# Patient Record
(demographics unavailable — no encounter records)

---

## 2024-12-30 NOTE — ASSESSMENT
[FreeTextEntry1] : I have discussed with pt that hernia pain usually radiates down the leg and does not wrap around the hip and back and I would therefore like to do a ct scan to make sure there is not another cause of his pain

## 2024-12-30 NOTE — REVIEW OF SYSTEMS
[Dysuria] : no dysuria [Incontinence] : no incontinence [Hesitancy] : no urinary hesitancy [Nocturia] : no nocturia [Genital Lesion] : no genital lesions [Testicular Pain] : no testicular pain [Arthralgias] : arthralgias [Joint Swelling] : no joint swelling [Joint Stiffness] : no joint stiffness [Limb Pain] : no limb pain [Limb Swelling] : no limb swelling [Easy Bleeding] : no tendency for easy bleeding [Easy Bruising] : a tendency for easy bruising [Swollen Glands] : no swollen glands [Swollen Glands In The Neck] : no swollen glands in the neck [Negative] : Endocrine [FreeTextEntry8] : hx of renal colic [FreeTextEntry9] : hx of scoliosis  [de-identified] : on ASA

## 2024-12-30 NOTE — PHYSICAL EXAM
[JVD] : no jugular venous distention  [Normal Thyroid] : the thyroid was normal [Carotid Bruits] : no carotid bruits [Normal Breath Sounds] : Normal breath sounds [Normal Heart Sounds] : normal heart sounds [Normal Rate and Rhythm] : normal rate and rhythm [No Rash or Lesion] : No rash or lesion [Alert] : alert [Oriented to Person] : oriented to person [Oriented to Place] : oriented to place [Oriented to Time] : oriented to time [Calm] : calm [de-identified] : well developed white male in no distress [de-identified] : non injected and non icteric [de-identified] : without adenopathy [de-identified] : normal bowel sounds, without distension or tenderness. [de-identified] : normal testicles, with a small right inguinal hernia, left side normal  [de-identified] : done by his PME [de-identified] : without calf pain or swelling

## 2024-12-30 NOTE — HISTORY OF PRESENT ILLNESS
[de-identified] : right inguinal hernia [de-identified] : 60 year old white male who presents c/o four weeks of right groin pain. Pt states his pain moves around his hip area. He has not felt a lump or swelling in the area. He denies any nausea or vomiting or changes in his bowel habits. He has a history of renal colic but denies any recent urinary symptoms.

## 2025-01-07 NOTE — PHYSICAL EXAM
[Normal Breath Sounds] : Normal breath sounds [Normal Heart Sounds] : normal heart sounds [Calm] : calm [de-identified] : well developed male in no distress [de-identified] : normal bowel sounds, without distension or tenderness.  [de-identified] : normal testicles, with small RIH, unable to feel LIH seen on ct scan [de-identified] : without calf pain or swelling

## 2025-01-07 NOTE — HISTORY OF PRESENT ILLNESS
[de-identified] : right inguinal hernia [de-identified] : Pt is here to review his ct scan results. He denies any changes in his symptoms. The ct scan showed; -a non obstructing left kidney stone, pt is aware and will follow up with his urologist.                                                                                                                                                    - lumbar spine disease- I have discussed these with pt and urged him to see an orthopedist                                                                                                                                            - small bilateral fat containing hernias

## 2025-01-07 NOTE — ASSESSMENT
[FreeTextEntry1] : I have discussed with pt the signs and symptoms of incarceration and strangulation and the importance of calling immediately should they develop. I have also discussed with pt the fact that his hernias are non tender and only contain fatty tissue he does not have to rush to fix them, IE he can do it after his sons wedding. I have discussed the risks, benefits and options. Pt would like to schedule a robotic repair of his bilateral inguinal hernias after his sons wedding.

## 2025-03-21 NOTE — ASSESSMENT
[FreeTextEntry1] : Renal US reviewed: no definite stone on right (none seen on prior CT); echogenic, but appears linear. There is unchanged 4-5 mm stone left lower, with some shadowing, perez, c/w prior. No hydro, no solid renal mass.  CT 01/2025 ([facility])- 4-5 mm nonobstructive calculus in the midpole of the left kidney.  Pt never started Saw palmetto. Voiding is back to baseline.   plan 1) RTC In 6 months with Renal US 2) cont diet mod, HCTZ

## 2025-03-21 NOTE — HISTORY OF PRESENT ILLNESS
[FreeTextEntry1] : 60 yr old male presents to follow up with kidney stones. Pt denies dysuria or hematuria.   8/2024- new concerns today regarding some hesitancy to begin, and some intermittency of urination, most different over last 2 weeks as far as noticeable, but has felt similar in past. Sometimes stream is fine, sometimes last.  Previously: They underwent left URS with laser left ureteral stones, left stent on 12/11/23 Stone analysis: not available  24 hour urine collection reviewed: f/u 2/2024 after diet mod and on HCTZ 25 mg volume: 2.7 liter calcium: 317 sodium: 238 oxalate: 37 citrate: 612 UA: 970 Animal protein markers: 60 for sulfates- all approx 150% greater than prior  Pt is seeing Ortho- may need a hip replacement.  [None] : no symptoms